# Patient Record
Sex: OTHER/UNKNOWN | URBAN - METROPOLITAN AREA
[De-identification: names, ages, dates, MRNs, and addresses within clinical notes are randomized per-mention and may not be internally consistent; named-entity substitution may affect disease eponyms.]

---

## 2020-03-12 ENCOUNTER — APPOINTMENT (OUTPATIENT)
Age: 25
Setting detail: DERMATOLOGY
End: 2020-03-18

## 2020-03-12 ENCOUNTER — RX ONLY (RX ONLY)
Age: 25
End: 2020-03-12

## 2020-03-12 VITALS
HEART RATE: 63 BPM | WEIGHT: 175 LBS | SYSTOLIC BLOOD PRESSURE: 119 MMHG | HEIGHT: 69 IN | DIASTOLIC BLOOD PRESSURE: 74 MMHG

## 2020-03-12 DIAGNOSIS — L65.9 NONSCARRING HAIR LOSS, UNSPECIFIED: ICD-10-CM

## 2020-03-12 PROCEDURE — OTHER MIPS QUALITY: OTHER

## 2020-03-12 PROCEDURE — OTHER TREATMENT REGIMEN: OTHER

## 2020-03-12 PROCEDURE — OTHER COUNSELING: OTHER

## 2020-03-12 PROCEDURE — OTHER PRESCRIPTION: OTHER

## 2020-03-12 PROCEDURE — 99202 OFFICE O/P NEW SF 15 MIN: CPT

## 2020-03-12 RX ORDER — CLOBETASOL PROPIONATE 0.5 MG/G
0.05% AEROSOL, FOAM TOPICAL QHS
Qty: 1 | Refills: 1 | Status: CANCELLED | COMMUNITY
Start: 2020-03-12

## 2020-03-12 RX ORDER — CLOBETASOL PROPIONATE 0.5 MG/G
0.05% AEROSOL, FOAM TOPICAL QHS
Qty: 1 | Refills: 1 | Status: ERX | COMMUNITY
Start: 2020-03-12

## 2020-03-12 ASSESSMENT — LOCATION SIMPLE DESCRIPTION DERM
LOCATION SIMPLE: LEFT FOREHEAD
LOCATION SIMPLE: RIGHT FOREHEAD
LOCATION SIMPLE: SCALP

## 2020-03-12 ASSESSMENT — LOCATION ZONE DERM
LOCATION ZONE: FACE
LOCATION ZONE: SCALP

## 2020-03-12 ASSESSMENT — LOCATION DETAILED DESCRIPTION DERM
LOCATION DETAILED: RIGHT FOREHEAD
LOCATION DETAILED: LEFT FOREHEAD
LOCATION DETAILED: RIGHT CENTRAL PARIETAL SCALP

## 2020-03-12 NOTE — PROCEDURE: TREATMENT REGIMEN
Detail Level: Zone
Initiate Treatment: Olux-E 0.05 % topical foam Sig: Apply to scalp every night for three weeks, then every other night for two weeks.

## 2020-03-25 ENCOUNTER — RX ONLY (RX ONLY)
Age: 25
End: 2020-03-25

## 2020-03-25 RX ORDER — CLOBETASOL PROPIONATE 0.5 MG/ML
0.05% SOLUTION TOPICAL QOHS
Qty: 1 | Refills: 0 | Status: CANCELLED | COMMUNITY
Start: 2020-03-25

## 2020-03-25 RX ORDER — CLOBETASOL PROPIONATE 0.5 MG/G
0.05% AEROSOL, FOAM TOPICAL QHS
Qty: 1 | Refills: 1 | Status: ERX

## 2020-03-25 RX ORDER — CLOBETASOL PROPIONATE 0.5 MG/G
0.05% AEROSOL, FOAM TOPICAL QHS
Qty: 1 | Refills: 1 | Status: CANCELLED

## 2022-06-28 NOTE — PROCEDURE: MIPS QUALITY
Patient requesting temperature recheck before blood draw, 98.7 oral, patient refuses blood draw, does not want to be seen by physician, directed to check in to sign out prior to being seen by physician.    
Detail Level: Detailed
Quality 226: Preventive Care And Screening: Tobacco Use: Screening And Cessation Intervention: Patient screened for tobacco use and is an ex/non-smoker
Quality 110: Preventive Care And Screening: Influenza Immunization: Influenza Immunization not Administered because Patient Refused.